# Patient Record
Sex: FEMALE | Race: WHITE
[De-identification: names, ages, dates, MRNs, and addresses within clinical notes are randomized per-mention and may not be internally consistent; named-entity substitution may affect disease eponyms.]

---

## 2020-11-12 ENCOUNTER — HOSPITAL ENCOUNTER (INPATIENT)
Dept: HOSPITAL 83 - ED | Age: 65
LOS: 5 days | Discharge: HOME | DRG: 445 | End: 2020-11-17
Attending: INTERNAL MEDICINE | Admitting: INTERNAL MEDICINE
Payer: SELF-PAY

## 2020-11-12 VITALS — BODY MASS INDEX: 34.05 KG/M2 | WEIGHT: 199.44 LBS | HEIGHT: 63.98 IN

## 2020-11-12 VITALS — DIASTOLIC BLOOD PRESSURE: 70 MMHG

## 2020-11-12 DIAGNOSIS — Z83.3: ICD-10-CM

## 2020-11-12 DIAGNOSIS — K80.21: Primary | ICD-10-CM

## 2020-11-12 DIAGNOSIS — C24.9: ICD-10-CM

## 2020-11-12 DIAGNOSIS — Z85.41: ICD-10-CM

## 2020-11-12 DIAGNOSIS — K82.8: ICD-10-CM

## 2020-11-12 DIAGNOSIS — R73.9: ICD-10-CM

## 2020-11-12 DIAGNOSIS — Z80.0: ICD-10-CM

## 2020-11-12 DIAGNOSIS — E80.6: ICD-10-CM

## 2020-11-12 DIAGNOSIS — E87.8: ICD-10-CM

## 2020-11-12 LAB
ALBUMIN SERPL-MCNC: 3.2 GM/DL (ref 3.1–4.5)
ALP SERPL-CCNC: 308 U/L (ref 45–117)
ALT SERPL W P-5'-P-CCNC: 138 U/L (ref 12–78)
AST SERPL-CCNC: 69 IU/L (ref 3–35)
BASOPHILS # BLD AUTO: 0 10*3/UL (ref 0–0.1)
BASOPHILS NFR BLD AUTO: 0.4 % (ref 0–1)
BUN SERPL-MCNC: 22 MG/DL (ref 7–24)
CHLORIDE SERPL-SCNC: 109 MMOL/L (ref 98–107)
CREAT SERPL-MCNC: 0.89 MG/DL (ref 0.55–1.02)
EOSINOPHIL # BLD AUTO: 0 10*3/UL (ref 0–0.4)
EOSINOPHIL # BLD AUTO: 0.5 % (ref 1–4)
ERYTHROCYTE [DISTWIDTH] IN BLOOD BY AUTOMATED COUNT: 13.8 % (ref 0–14.5)
HCT VFR BLD AUTO: 41.4 % (ref 37–47)
INR BLD: 1 (ref 2–3.5)
LIPASE SERPL-CCNC: 188 U/L (ref 73–393)
LYMPHOCYTES # BLD AUTO: 0.7 10*3/UL (ref 1.3–4.4)
LYMPHOCYTES NFR BLD AUTO: 13 % (ref 27–41)
MCH RBC QN AUTO: 30.4 PG (ref 27–31)
MCHC RBC AUTO-ENTMCNC: 32.9 G/DL (ref 33–37)
MCV RBC AUTO: 92.4 FL (ref 81–99)
MONOCYTES # BLD AUTO: 0.2 10*3/UL (ref 0.1–1)
MONOCYTES NFR BLD MANUAL: 4 % (ref 3–9)
NEUT #: 4.7 10*3/UL (ref 2.3–7.9)
NEUT %: 81.9 % (ref 47–73)
NRBC BLD QL AUTO: 0 10*3/UL (ref 0–0)
PLATELET # BLD AUTO: 224 10*3/UL (ref 130–400)
PMV BLD AUTO: 11 FL (ref 9.6–12.3)
POTASSIUM SERPL-SCNC: 4.4 MMOL/L (ref 3.5–5.1)
PROT SERPL-MCNC: 8.6 GM/DL (ref 6.4–8.2)
RBC # BLD AUTO: 4.48 10*6/UL (ref 4.1–5.1)
SODIUM SERPL-SCNC: 139 MMOL/L (ref 136–145)
WBC NRBC COR # BLD AUTO: 5.7 10*3/UL (ref 4.8–10.8)

## 2020-11-13 VITALS — DIASTOLIC BLOOD PRESSURE: 78 MMHG

## 2020-11-13 VITALS — DIASTOLIC BLOOD PRESSURE: 63 MMHG | SYSTOLIC BLOOD PRESSURE: 141 MMHG

## 2020-11-13 VITALS — DIASTOLIC BLOOD PRESSURE: 74 MMHG | SYSTOLIC BLOOD PRESSURE: 121 MMHG

## 2020-11-13 VITALS — DIASTOLIC BLOOD PRESSURE: 66 MMHG

## 2020-11-13 VITALS — DIASTOLIC BLOOD PRESSURE: 61 MMHG

## 2020-11-13 VITALS — DIASTOLIC BLOOD PRESSURE: 68 MMHG | SYSTOLIC BLOOD PRESSURE: 147 MMHG

## 2020-11-13 VITALS — DIASTOLIC BLOOD PRESSURE: 62 MMHG | SYSTOLIC BLOOD PRESSURE: 113 MMHG

## 2020-11-13 VITALS — DIASTOLIC BLOOD PRESSURE: 50 MMHG

## 2020-11-13 LAB
ALBUMIN SERPL-MCNC: 3 GM/DL (ref 3.1–4.5)
ALP SERPL-CCNC: 290 U/L (ref 45–117)
ALT SERPL W P-5'-P-CCNC: 142 U/L (ref 12–78)
AST SERPL-CCNC: 73 IU/L (ref 3–35)
BASOPHILS # BLD AUTO: 0 10*3/UL (ref 0–0.1)
BASOPHILS NFR BLD AUTO: 0.6 % (ref 0–1)
BUN SERPL-MCNC: 25 MG/DL (ref 7–24)
CHLORIDE SERPL-SCNC: 112 MMOL/L (ref 98–107)
CREAT SERPL-MCNC: 0.78 MG/DL (ref 0.55–1.02)
EOSINOPHIL # BLD AUTO: 0.1 10*3/UL (ref 0–0.4)
EOSINOPHIL # BLD AUTO: 1.1 % (ref 1–4)
ERYTHROCYTE [DISTWIDTH] IN BLOOD BY AUTOMATED COUNT: 14.1 % (ref 0–14.5)
HCT VFR BLD AUTO: 40 % (ref 37–47)
LYMPHOCYTES # BLD AUTO: 1 10*3/UL (ref 1.3–4.4)
LYMPHOCYTES NFR BLD AUTO: 16.3 % (ref 27–41)
MCH RBC QN AUTO: 30.5 PG (ref 27–31)
MCHC RBC AUTO-ENTMCNC: 32.8 G/DL (ref 33–37)
MCV RBC AUTO: 93.2 FL (ref 81–99)
MONOCYTES # BLD AUTO: 0.6 10*3/UL (ref 0.1–1)
MONOCYTES NFR BLD MANUAL: 9.4 % (ref 3–9)
NEUT #: 4.5 10*3/UL (ref 2.3–7.9)
NEUT %: 72.3 % (ref 47–73)
NRBC BLD QL AUTO: 0 10*3/UL (ref 0–0)
PLATELET # BLD AUTO: 192 10*3/UL (ref 130–400)
PMV BLD AUTO: 11.4 FL (ref 9.6–12.3)
POTASSIUM SERPL-SCNC: 4.2 MMOL/L (ref 3.5–5.1)
PROT SERPL-MCNC: 7.9 GM/DL (ref 6.4–8.2)
RBC # BLD AUTO: 4.29 10*6/UL (ref 4.1–5.1)
SODIUM SERPL-SCNC: 141 MMOL/L (ref 136–145)
WBC NRBC COR # BLD AUTO: 6.2 10*3/UL (ref 4.8–10.8)

## 2020-11-13 PROCEDURE — BF111ZZ FLUOROSCOPY OF BILIARY AND PANCREATIC DUCTS USING LOW OSMOLAR CONTRAST: ICD-10-PCS | Performed by: INTERNAL MEDICINE

## 2020-11-13 PROCEDURE — 0FJB8ZZ INSPECTION OF HEPATOBILIARY DUCT, VIA NATURAL OR ARTIFICIAL OPENING ENDOSCOPIC: ICD-10-PCS | Performed by: INTERNAL MEDICINE

## 2020-11-13 NOTE — NUR
THIS RN CALLED CONSULT FOR DR ELMORE.WHILE SPEAKING WITH DR ELMORE CALL WAS
DISCONNECTED.AWAITING TO SEE IF DR ELMORE RETURNS CALL.

## 2020-11-13 NOTE — NUR
Time: 2000
A 65  year old FEMALE admitted to 5E
under services of LILIYA COTTO DO.
Pt. arrived via bed from
ER.  Chief complaint: ELEVATED LFT, RASH.
PT FORMS REVIEWED AND COMPLETED. CALL LIGHT SYSTEM REVIEWED AND DEMONSTRATED.
PT REPORTS NO HOME MEDS
 
PETEY GOLDEN

## 2020-11-13 NOTE — NUR
ASSESSMENT COMPLETE SEE FLOWSHEET. SPOKE TO  AND RECEIVED ORDERS FOR
ERCP IN AM, NPO AFTER MIDNIGHT, AND START FLUIDS (0.9 NS)  ML/HR. CALL
LIGHT IN REACH.

## 2020-11-13 NOTE — NUR
SPOKE WITH SURGERY PT [facility] IN PRE-OP FOR ERCP. PT HAS A -2. SURGERY
NOTFIED TO TAKE PT -2, REPORTS CALLED TO [facility].

## 2020-11-13 NOTE — NUR
CALL PLACED TO DR. ELMORE, NO ANSWER LEFT VOICE MAIL FOR CALL BACK,  CALL
PLACED TO DR. RICHMOND ADVISED OF CONSULT.

## 2020-11-13 NOTE — NUR
PT MEDICATED PER EMAR FOR ITCHING. PROVIDED PT WITH ADDITIONAL BLANKETS AND A
PILLOW FOR COMFORT. NO FURTHER COMPLAINTS.

## 2020-11-13 NOTE — NUR
NURSE TO NURSE REPORT GIVEN TO THIS RN.PT RESTING IN BED.IV HEPLOCK IN
PLACE.DENIES ANY NEEDS AT THIS TIME.PT REFUSES TO GOWN AT THIS TIME.

## 2020-11-13 NOTE — NUR
PT RESTING WITH HER EYES CLOSED, AWAKENS WITH CARE, PT DENIES ANY PAIN AT THIS
TIME, PT WAS GIVEN A GROWN FOR HER ERCP TODAY. PT WAS WANTING TO STAY IN HER
CLOTHES, PT WAS AGREEABLE.

## 2020-11-14 VITALS — DIASTOLIC BLOOD PRESSURE: 77 MMHG | SYSTOLIC BLOOD PRESSURE: 136 MMHG

## 2020-11-14 VITALS — DIASTOLIC BLOOD PRESSURE: 93 MMHG

## 2020-11-14 VITALS — DIASTOLIC BLOOD PRESSURE: 82 MMHG | SYSTOLIC BLOOD PRESSURE: 120 MMHG

## 2020-11-14 VITALS — DIASTOLIC BLOOD PRESSURE: 68 MMHG | SYSTOLIC BLOOD PRESSURE: 127 MMHG

## 2020-11-14 VITALS — DIASTOLIC BLOOD PRESSURE: 69 MMHG | SYSTOLIC BLOOD PRESSURE: 135 MMHG

## 2020-11-14 VITALS — DIASTOLIC BLOOD PRESSURE: 74 MMHG

## 2020-11-14 VITALS — DIASTOLIC BLOOD PRESSURE: 69 MMHG

## 2020-11-14 VITALS — DIASTOLIC BLOOD PRESSURE: 91 MMHG

## 2020-11-14 VITALS — DIASTOLIC BLOOD PRESSURE: 73 MMHG | SYSTOLIC BLOOD PRESSURE: 156 MMHG

## 2020-11-14 LAB
ALBUMIN SERPL-MCNC: 2.9 GM/DL (ref 3.1–4.5)
ALP SERPL-CCNC: 271 U/L (ref 45–117)
ALT SERPL W P-5'-P-CCNC: 154 U/L (ref 12–78)
AST SERPL-CCNC: 83 IU/L (ref 3–35)
BUN SERPL-MCNC: 27 MG/DL (ref 7–24)
CHLORIDE SERPL-SCNC: 112 MMOL/L (ref 98–107)
CREAT SERPL-MCNC: 0.77 MG/DL (ref 0.55–1.02)
HEPATITIS C VIRUS ANTIBODY: 0.2 S/CO (ref 0–0.9)
POTASSIUM SERPL-SCNC: 3.9 MMOL/L (ref 3.5–5.1)
PROT SERPL-MCNC: 7.3 GM/DL (ref 6.4–8.2)
SODIUM SERPL-SCNC: 142 MMOL/L (ref 136–145)

## 2020-11-14 PROCEDURE — BF111ZZ FLUOROSCOPY OF BILIARY AND PANCREATIC DUCTS USING LOW OSMOLAR CONTRAST: ICD-10-PCS | Performed by: INTERNAL MEDICINE

## 2020-11-14 PROCEDURE — 0FB98ZX EXCISION OF COMMON BILE DUCT, VIA NATURAL OR ARTIFICIAL OPENING ENDOSCOPIC, DIAGNOSTIC: ICD-10-PCS | Performed by: INTERNAL MEDICINE

## 2020-11-14 PROCEDURE — 0F798DZ DILATION OF COMMON BILE DUCT WITH INTRALUMINAL DEVICE, VIA NATURAL OR ARTIFICIAL OPENING ENDOSCOPIC: ICD-10-PCS | Performed by: INTERNAL MEDICINE

## 2020-11-14 NOTE — NUR
PT BACK FROM SURGERY. VSS. RESPS EASY AND NON LABORED. WILL CONTINUE TO
MONITOR. CALL LIGHT WITHIN REACH.

## 2020-11-14 NOTE — NUR
ASSESSMENT COMPLETE SEE FLOWSHEET. COOPERATIVE. NO C/O VOICED. TOOK PO MEDS
WITHOUT DIFFICULTY. ALL SAFETY MEASURES IN PLACE. CALL LIGHT IN REACH.

## 2020-11-14 NOTE — NUR
PT RESTING IN BED. RESPS EASY AND NON LABORED. NO S/S OF DISTRESS NOTED. VSS.
WHITE BOARD UPDATED. POC DISCUSSED W PT. A/O  X3. REMAINS JAUNDICED.
HYPOACTIVE BOWEL SOUNDS.+FLATUS.BM 11/13. IVF INFUSING W/O INCIDENT. DENIES
ABD PAIN/DISCOMFORT. WILL CONTINUE TO MONITOR. CALL LIGHT WITHIN REACH.

## 2020-11-14 NOTE — NUR
PT MEDICATED W ZOFRAN AND TYLENOL FOR NAUSEA AND ABD DISCOMFORT. WILL MONITOR.
CALL LIGHT WITHIN REACH.

## 2020-11-15 VITALS — DIASTOLIC BLOOD PRESSURE: 62 MMHG | SYSTOLIC BLOOD PRESSURE: 113 MMHG

## 2020-11-15 VITALS — DIASTOLIC BLOOD PRESSURE: 50 MMHG | SYSTOLIC BLOOD PRESSURE: 116 MMHG

## 2020-11-15 VITALS — DIASTOLIC BLOOD PRESSURE: 55 MMHG

## 2020-11-15 VITALS — DIASTOLIC BLOOD PRESSURE: 56 MMHG

## 2020-11-15 VITALS — DIASTOLIC BLOOD PRESSURE: 60 MMHG | SYSTOLIC BLOOD PRESSURE: 125 MMHG

## 2020-11-15 LAB
ALBUMIN SERPL-MCNC: 2.7 GM/DL (ref 3.1–4.5)
ALBUMIN SERPL-MCNC: 2.7 GM/DL (ref 3.1–4.5)
ALP SERPL-CCNC: 248 U/L (ref 45–117)
ALP SERPL-CCNC: 251 U/L (ref 45–117)
ALT SERPL W P-5'-P-CCNC: 139 U/L (ref 12–78)
ALT SERPL W P-5'-P-CCNC: 140 U/L (ref 12–78)
AST SERPL-CCNC: 72 IU/L (ref 3–35)
AST SERPL-CCNC: 72 IU/L (ref 3–35)
BUN SERPL-MCNC: 23 MG/DL (ref 7–24)
CHLORIDE SERPL-SCNC: 111 MMOL/L (ref 98–107)
CREAT SERPL-MCNC: 0.69 MG/DL (ref 0.55–1.02)
POTASSIUM SERPL-SCNC: 3.9 MMOL/L (ref 3.5–5.1)
PROT SERPL-MCNC: 6.9 GM/DL (ref 6.4–8.2)
PROT SERPL-MCNC: 7 GM/DL (ref 6.4–8.2)
SODIUM SERPL-SCNC: 141 MMOL/L (ref 136–145)

## 2020-11-15 NOTE — NUR
PT MEDICATED FOR C/O ABD DISCOMFORT. PT STATES IT HAS BEEN HURTING LIKE THIS
SINCE YESTERDAY AND SHE COULDNT SLEEP ALL NIGHT. STATES THAT SHE CANNOT
DESCRIBE THE PAIN BUT IT "IS KILLNG HER". WILL CONTINUE TO MONITOR FOR RELIEF.
RESPS EASY AND NON LABORED. RESTING IN BED WATCHING TV. CALL LIGHT WITHIN
REACH.

## 2020-11-15 NOTE — NUR
Patient resting quietly with no c/o discomfort. Respirations easy and regular.
Vital signs stable. No overt distress. NO S/S OF DISTRESS NOTED. SLEEPING IN
ROOM. WILL CONTINUE TO MONITOR . CALL LIGHT WITHIN REACH.
HISSOM,ARTUR

## 2020-11-15 NOTE — NUR
PT RESTING IN BED. RESPS EASY AND NON LABORED. NO S/S OF DISTRESS NOTED. VSS.
WHITE BOARD UPDATED. POC DISCUSSED W PT. A/O X3. REMAINS JAUNDICED.
NORMOACTIVE BOWEL SOUNDS. PT C/O GAS AND CRAMPS. WILL CONTINUE TO MONITOR.
CALL LIGHT WITHIN REACH.

## 2020-11-15 NOTE — NUR
PT SITTING UP IN ROOM. RESPS EASY AND NON LABORED. NO S/S OF DISTRESS NOTED.
PT STATES THAT THE DILAUDID HELPED HER ALL DAY TODAY AND THAT SHE IS NO LONGER
IN PAIN AT THIS TIME. WILL CONTINUE TO MONITOR. CALL LIGHT WITHIN REACH.

## 2020-11-16 VITALS — DIASTOLIC BLOOD PRESSURE: 56 MMHG | SYSTOLIC BLOOD PRESSURE: 131 MMHG

## 2020-11-16 VITALS — DIASTOLIC BLOOD PRESSURE: 66 MMHG | SYSTOLIC BLOOD PRESSURE: 124 MMHG

## 2020-11-16 VITALS — DIASTOLIC BLOOD PRESSURE: 63 MMHG | SYSTOLIC BLOOD PRESSURE: 127 MMHG

## 2020-11-16 VITALS — SYSTOLIC BLOOD PRESSURE: 143 MMHG | DIASTOLIC BLOOD PRESSURE: 56 MMHG

## 2020-11-16 VITALS — DIASTOLIC BLOOD PRESSURE: 68 MMHG

## 2020-11-16 LAB
ALBUMIN SERPL-MCNC: 2.6 GM/DL (ref 3.1–4.5)
ALP SERPL-CCNC: 233 U/L (ref 45–117)
ALT SERPL W P-5'-P-CCNC: 98 U/L (ref 12–78)
AST SERPL-CCNC: 41 IU/L (ref 3–35)
BUN SERPL-MCNC: 18 MG/DL (ref 7–24)
CHLORIDE SERPL-SCNC: 109 MMOL/L (ref 98–107)
CREAT SERPL-MCNC: 0.53 MG/DL (ref 0.55–1.02)
POTASSIUM SERPL-SCNC: 3.7 MMOL/L (ref 3.5–5.1)
PROT SERPL-MCNC: 6.6 GM/DL (ref 6.4–8.2)
SODIUM SERPL-SCNC: 138 MMOL/L (ref 136–145)

## 2020-11-16 NOTE — NUR
CALLED [facility] RADIOLOGY REGARDING MRI STILL NOT BEING READ. STATE THEY
WILL HAVE IT READ.

## 2020-11-16 NOTE — NUR
SLEEPING, AWAKENS EASILY. RESPIRATIONS EASY. LUNGS DIMINISHED, CLEAR. PULSE OX
98% RA. SCDS PRESENT AT BEDSIDE BUT NOT IN USE PER PT REQUEST. IV FLUIDS
RECONNECTED AT THIS TIME AND INFUSING PER ORDER. CALL LIGHT WITHIN REACH. NO
VOICED COMPLAINTS

## 2020-11-16 NOTE — NUR
in to talk to patient.
Patient states lives at home with her .
There are 0 steps in the home.
Physician: no family physician
Pharmacy: [facility]
Home health services: none
Patient's level of ADLs: INDEPENDENT
Patient has working utilities: yes
DME: none
Follow-up physician's appointment after d/c: will be made by the hospitalist
nurse director upon discharge
Does patient want to access PORTAL?: no
Discharge plan discussed with patient. She lives at home with her . She
states she is independent in her ADLs and ambulation. Discussed home health
care services and she declines. CM will continue to follow for any discharge
planning needs. When medically stable she will be discharged to home. She
states her daughter will provide transportation on discharge.
 
JUNO EDUARDO

## 2020-11-16 NOTE — NUR
Patient resting quietly with no c/o discomfort. Respirations easy and regular.
Vital signs stable. No overt distress.
HISSOM,ARTUR

## 2020-11-16 NOTE — NUR
PT RESTING IN BED. RESPS EASY AND NON LABORED. NO S/S OF DISTRESS NOTED. VSS.
WHITE BOARD UPDATED. POC DISCUSSED W PT. A/O X3. JAUNDICE IMPROVED TODAY. LFT
TRENDING DOWN.DENIES ABD PAIN, NO NVCD,NT,ND.NORMOACTIVE BS. BM 11/15. MRCP
THIS MORNING. WILL CONTINUE TO MONITOR. CALL LIGHT WITHIN REACH. IVF INFUSING
W/O INCIDENT.

## 2020-11-17 VITALS — DIASTOLIC BLOOD PRESSURE: 65 MMHG | SYSTOLIC BLOOD PRESSURE: 139 MMHG

## 2020-11-17 VITALS — DIASTOLIC BLOOD PRESSURE: 60 MMHG | SYSTOLIC BLOOD PRESSURE: 131 MMHG

## 2020-11-17 LAB
ALBUMIN SERPL-MCNC: 2.4 GM/DL (ref 3.1–4.5)
ALP SERPL-CCNC: 212 U/L (ref 45–117)
ALT SERPL W P-5'-P-CCNC: 87 U/L (ref 12–78)
AST SERPL-CCNC: 45 IU/L (ref 3–35)
BUN SERPL-MCNC: 16 MG/DL (ref 7–24)
CHLORIDE SERPL-SCNC: 109 MMOL/L (ref 98–107)
CREAT SERPL-MCNC: 0.67 MG/DL (ref 0.55–1.02)
POTASSIUM SERPL-SCNC: 3.4 MMOL/L (ref 3.5–5.1)
PROT SERPL-MCNC: 6.4 GM/DL (ref 6.4–8.2)
SODIUM SERPL-SCNC: 140 MMOL/L (ref 136–145)

## 2020-11-17 NOTE — NUR
FREYA HERNANDEZ ON FLOOR AND UPDATED ON PLAN OF CARE. FREYA HERNANDEZ WILL NOTIFY
 REGARDING MRCP RESULTS.

## 2020-11-17 NOTE — NUR
Discharge instructions reviewed with patient/family. Patient receptive and
verbalizes understanding. Follow-up care arranged. Written instructions given
to patient/family.
GIL BYRD.

## 2020-11-17 NOTE — NUR
THIS NURSE CALLED  REGARDING FOLLOW UP APT. FOLLOW UP APT MADE FOR
NEXT TUESDAY 11/24. WILL INFORM PATIENT ONCE SHE IS DISCHARGED.

## 2020-11-17 NOTE — NUR
SLEPT THROUGHOUT NIGHT WITH NO DISTRESS NOTED. RESPIRATIONS EASY. IV FLUIDS
MAINTAINED. CALL LIGHT WITHIN REACH. NO VOICED COMPLAINTS

## 2020-11-23 ENCOUNTER — HOSPITAL ENCOUNTER (OUTPATIENT)
Dept: HOSPITAL 83 - LAB | Age: 65
Discharge: HOME | End: 2020-11-23
Attending: NURSE PRACTITIONER
Payer: SELF-PAY

## 2020-11-23 DIAGNOSIS — K80.20: Primary | ICD-10-CM

## 2020-11-23 DIAGNOSIS — K82.8: ICD-10-CM

## 2020-11-23 DIAGNOSIS — R17: ICD-10-CM

## 2020-11-23 LAB
ALBUMIN SERPL-MCNC: 2.8 GM/DL (ref 3.1–4.5)
ALP SERPL-CCNC: 186 U/L (ref 45–117)
ALT SERPL W P-5'-P-CCNC: 69 U/L (ref 12–78)
AST SERPL-CCNC: 35 IU/L (ref 3–35)
BASOPHILS # BLD AUTO: 0.1 10*3/UL (ref 0–0.1)
BASOPHILS NFR BLD AUTO: 0.6 % (ref 0–1)
BUN SERPL-MCNC: 22 MG/DL (ref 7–24)
CHLORIDE SERPL-SCNC: 110 MMOL/L (ref 98–107)
CREAT SERPL-MCNC: 0.69 MG/DL (ref 0.55–1.02)
EOSINOPHIL # BLD AUTO: 0.4 10*3/UL (ref 0–0.4)
EOSINOPHIL # BLD AUTO: 3.7 % (ref 1–4)
ERYTHROCYTE [DISTWIDTH] IN BLOOD BY AUTOMATED COUNT: 13.2 % (ref 0–14.5)
HCT VFR BLD AUTO: 38.9 % (ref 37–47)
LYMPHOCYTES # BLD AUTO: 2 10*3/UL (ref 1.3–4.4)
LYMPHOCYTES NFR BLD AUTO: 21.1 % (ref 27–41)
MCH RBC QN AUTO: 30.5 PG (ref 27–31)
MCHC RBC AUTO-ENTMCNC: 32.1 G/DL (ref 33–37)
MCV RBC AUTO: 94.9 FL (ref 81–99)
MONOCYTES # BLD AUTO: 0.7 10*3/UL (ref 0.1–1)
MONOCYTES NFR BLD MANUAL: 7.2 % (ref 3–9)
NEUT #: 6.3 10*3/UL (ref 2.3–7.9)
NEUT %: 66.9 % (ref 47–73)
NRBC BLD QL AUTO: 0 % (ref 0–0)
PLATELET # BLD AUTO: 243 10*3/UL (ref 130–400)
PMV BLD AUTO: 11.1 FL (ref 9.6–12.3)
POTASSIUM SERPL-SCNC: 4.2 MMOL/L (ref 3.5–5.1)
PROT SERPL-MCNC: 7.5 GM/DL (ref 6.4–8.2)
RBC # BLD AUTO: 4.1 10*6/UL (ref 4.1–5.1)
SODIUM SERPL-SCNC: 140 MMOL/L (ref 136–145)
WBC NRBC COR # BLD AUTO: 9.4 10*3/UL (ref 4.8–10.8)